# Patient Record
Sex: MALE | Race: WHITE | URBAN - METROPOLITAN AREA
[De-identification: names, ages, dates, MRNs, and addresses within clinical notes are randomized per-mention and may not be internally consistent; named-entity substitution may affect disease eponyms.]

---
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
## 2018-02-13 PROBLEM — M54.16 LUMBAR RADICULOPATHY: Status: ACTIVE | Noted: 2018-02-13

## 2018-02-13 PROBLEM — M51.26 LUMBAR HERNIATED DISC: Status: ACTIVE | Noted: 2018-02-13

## 2024-03-04 ENCOUNTER — TELEPHONE (OUTPATIENT)
Dept: ORTHOPEDICS CLINIC | Facility: CLINIC | Age: 44
End: 2024-03-04

## 2024-03-04 DIAGNOSIS — M79.632 LEFT FOREARM PAIN: Primary | ICD-10-CM

## 2024-03-04 NOTE — TELEPHONE ENCOUNTER
Can you see patient? When  DOI:1/19/24  Fall  Last imaging 3/4/24    - fracture line that extends from the midshaft of the ulna to the distal ulna.  There is no displacement or dislocation. The ulna styloid is preserved. The radius is intact. The joint spaces are relatively preserved.     Wrapped with ace bandages.   Patient has CD imaging.

## 2024-03-05 NOTE — TELEPHONE ENCOUNTER
Lydia Vivar PA   to [facility]  Ghanshyam Edmond MD Velasco, Natalie, MA EK    3/5/24 10:10 AM   What kind of insurance does he have? Theres a note from Mariann a week or so ago about how he would have to go to [facility]?